# Patient Record
Sex: MALE | Race: WHITE | NOT HISPANIC OR LATINO
[De-identification: names, ages, dates, MRNs, and addresses within clinical notes are randomized per-mention and may not be internally consistent; named-entity substitution may affect disease eponyms.]

---

## 2023-08-29 PROBLEM — Z00.00 ENCOUNTER FOR PREVENTIVE HEALTH EXAMINATION: Status: ACTIVE | Noted: 2023-08-29

## 2023-09-07 ENCOUNTER — APPOINTMENT (OUTPATIENT)
Dept: UROLOGY | Facility: CLINIC | Age: 63
End: 2023-09-07
Payer: SELF-PAY

## 2023-09-07 VITALS
SYSTOLIC BLOOD PRESSURE: 136 MMHG | BODY MASS INDEX: 22.96 KG/M2 | WEIGHT: 155 LBS | HEIGHT: 69 IN | TEMPERATURE: 97.8 F | DIASTOLIC BLOOD PRESSURE: 82 MMHG

## 2023-09-07 PROCEDURE — 99205 OFFICE O/P NEW HI 60 MIN: CPT

## 2023-09-07 RX ORDER — ATORVASTATIN CALCIUM 80 MG/1
TABLET, FILM COATED ORAL
Refills: 0 | Status: ACTIVE | COMMUNITY

## 2023-09-07 RX ORDER — TESTOSTERONE CYPIONATE 200 MG/ML
VIAL (ML) INTRAMUSCULAR
Refills: 0 | Status: ACTIVE | COMMUNITY

## 2023-09-07 RX ORDER — LISINOPRIL 20 MG/1
20 TABLET ORAL
Refills: 0 | Status: ACTIVE | COMMUNITY

## 2023-09-07 NOTE — PHYSICAL EXAM
[General Appearance - Well Developed] : well developed [General Appearance - Well Nourished] : well nourished [Normal Appearance] : normal appearance [Well Groomed] : well groomed [General Appearance - In No Acute Distress] : no acute distress [] : no respiratory distress [Edema] : no peripheral edema [Respiration, Rhythm And Depth] : normal respiratory rhythm and effort [Exaggerated Use Of Accessory Muscles For Inspiration] : no accessory muscle use [Abdomen Soft] : soft [Abdomen Tenderness] : non-tender [Costovertebral Angle Tenderness] : no ~M costovertebral angle tenderness [Urethral Meatus] : meatus normal [Urinary Bladder Findings] : the bladder was normal on palpation [Scrotum] : the scrotum was normal [Testes Mass (___cm)] : there were no testicular masses [No Prostate Nodules] : no prostate nodules [FreeTextEntry1] : 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  Folds of the cylinders are trapped in scar tissue when the device is inflated.  Patient has a significant curvature which gives a hinge effect with no rigidity when the implant is inflated.  Tubing to the reservoir is visible and palpable at the right lateral aspect of the shaft of the penis near the groin.  The pump is in an anterior position and high riding.

## 2023-09-07 NOTE — ASSESSMENT
[FreeTextEntry1] : The patient presents with with the chief complaint of a malfunction of his penile implant. The patient scheduled this consultation to discuss the different treatment options available for his malfunctioning implant. The following note describes the conversation that was performed today during the consultation.   I reviewed the Patient History Form which the patient filled out.  In discussing penile implant replacement surgery, the patient was made aware of the different types of penile implants- including semirigid devices, 2-piece or Ambicor (AMS) devices, and the inflatable penile prosthesis with 3 components. I went on to mention that there are 2 brands of devices, Coloplast and AMS, and that the AMS is impregnated with antibiotic (inhibizone), and the Coloplast is dipped then coated with an antibiotic. I also referred him to my website in order to obtain additional information about the types of implants available.  He felt he would defer to my judgment as to which device to use.  I also described the highlights and benefits of the "No-Touch" surgical technique and outcome data including number of procedures previously performed and updated rate of infection. In this initial discussion of the penile implant option, I made sure we had a very long and brad discussion about the risks.  I stated that, first and foremost, infection is the most dreaded risk and complication, which range in incidence from 1-3% of all cases performed in the [place], but that in my hands, using the "No-Touch" technique the incidence of infection is less than 1%.  I stated that should infection occur, the entire device would need to be removed, which typically happens in the first several weeks after surgery.  I explained that should this occur, there would likely be corporal fibrosis, scarring, penile shortening and even penile necrosis and disfigurement.  I said that while I would do absolutely everything possible to reduce and mitigate this risk, if it occur, the device would have to be removed, and then a salvage procedure with a semi-rigid implant possibly done, or the device would have to be removed with delayed re-implantation, or simply avoid future surgery completely.  I explained what this salvage procedure is, and that a new implant could be placed in the same setting with a complex irrigation of antibiotics and saline lavage, but that the infection risk at this salvage procedure is even higher, up to 30%. Furthermore the possible need for hospitalization, prolonged intravenous antibiotics and need further additional surgery was also discussed.  The patient was informed that if the salvage operation failed or if the infected implant were to be removed completely that significant shortening of the penis would occur making implantation of another device very difficult with very poor outcome and patient satisfaction. I explained that this is a real and significant risk that has to be weighed and considered.  Next I expounded on the other risks of the operation.  These include injury to the urethra or bladder, and should these occur, the operation would have to be altered or aborted.  I explained that very rarely, vascular injury and bleeding can happen, and if iliac vein injury occurs from reservoir placement, this could be catastrophic and result in major blood loss and theoretically risk of leg loss in severe instances.    I went on to discuss that after the implant is placed, penile shortening could likely occur, and this is up to 1-2cm total.  Some of this is due to lack of glans engorgement, though MUSE could be used post-operatively to reduce this factor.  Next I also explained the risk of dissatisfaction with the cosmetic or functional result of the device, meaning that he could simply be unhappy with the result.  Some people find that while they have a good full erection, they have changes in sensation, difficulty obtaining an orgasm, and dissatisfaction with sex in general.  I made sure he verbalized and demonstrated a good understanding of these points.  Next, I explained the risk of device breakage or failure, and future operations might be needed should this occur to fix the device tubing breakages with fluid leaks.  There is also a risk of auto-inflation, and even inability to successfully use the device due to technical considerations and inability to use or find the pump.  I did state that I would be available to him to teach him and train him to use his device, and also available to treat any other issue mentioned above such asdevice breakage or auto-inflation.  Next we discussed the fact that rarely further minor surgery may be needed to make final adjustments to the penile implant. Reasons for this would be to adjust the length of the cylinders, reposition the pump or location of the reservoir.   Prior to scheduling surgery the patient was asked to read the material which is provided to him today, to see a cardiologist to obtain a medical clearance, to visit our website [email or web address]   to obtain additional information, to call patients who were previously implanted and to discuss his options with his partner. Before leaving the consultation, I made sure he verbalized understanding all the risk and benefits, and pros and cons of replacement surgery.  He had the ability to ask questions, and I also explained to him what to expect from the surgery.

## 2023-09-07 NOTE — ASSESSMENT
[FreeTextEntry1] : The patient presents with with the chief complaint of a mechanical breakdown of his penile implant. The patient scheduled this consultation to discuss the different treatment options available for his malfunctioning implant. The following note describes the conversation that was performed today during the consultation.   I reviewed the Patient History Form which the patient filled out.  In discussing penile implant replacement surgery, the patient was made aware of the different types of penile implants- including semirigid devices, 2-piece or Ambicor (AMS) devices, and the inflatable penile prosthesis with 3 components. I went on to mention that there are 2 brands of devices, Coloplast and AMS, and that the AMS is impregnated with antibiotic (inhibizone), and the Coloplast is dipped then coated with an antibiotic. I also referred him to my website in order to obtain additional information about the types of implants available.  He felt he would defer to my judgment as to which device to use.  I also described the highlights and benefits of the "No-Touch" surgical technique and outcome data including number of procedures previously performed and updated rate of infection. In this initial discussion of the penile implant option, I made sure we had a very long and brad discussion about the risks.  I stated that, first and foremost, infection is the most dreaded risk and complication, which range in incidence from 1-3% of all cases performed in the [place], but that in my hands, using the "No-Touch" technique the incidence of infection is less than 1%.  I stated that should infection occur, the entire device would need to be removed, which typically happens in the first several weeks after surgery.  I explained that should this occur, there would likely be corporal fibrosis, scarring, penile shortening and even penile necrosis and disfigurement.  I said that while I would do absolutely everything possible to reduce and mitigate this risk, if it occur, the device would have to be removed, and then a salvage procedure with a semi-rigid implant possibly done, or the device would have to be removed with delayed re-implantation, or simply avoid future surgery completely.  I explained what this salvage procedure is, and that a new implant could be placed in the same setting with a complex irrigation of antibiotics and saline lavage, but that the infection risk at this salvage procedure is even higher, up to 30%. Furthermore the possible need for hospitalization, prolonged intravenous antibiotics and need further additional surgery was also discussed.  The patient was informed that if the salvage operation failed or if the infected implant were to be removed completely that significant shortening of the penis would occur making implantation of another device very difficult with very poor outcome and patient satisfaction. I explained that this is a real and significant risk that has to be weighed and considered.  Next I expounded on the other risks of the operation.  These include injury to the urethra or bladder, and should these occur, the operation would have to be altered or aborted.  I explained that very rarely, vascular injury and bleeding can happen, and if iliac vein injury occurs from reservoir placement, this could be catastrophic and result in major blood loss and theoretically risk of leg loss in severe instances.    I went on to discuss that after the implant is placed, penile shortening could likely occur, and this is up to 1-2cm total.  Some of this is due to lack of glans engorgement, though MUSE could be used post-operatively to reduce this factor.  Next I also explained the risk of dissatisfaction with the cosmetic or functional result of the device, meaning that he could simply be unhappy with the result.  Some people find that while they have a good full erection, they have changes in sensation, difficulty obtaining an orgasm, and dissatisfaction with sex in general.  I made sure he verbalized and demonstrated a good understanding of these points.  Next, I explained the risk of device breakage or failure, and future operations might be needed should this occur to fix the device tubing breakages with fluid leaks.  There is also a risk of auto-inflation, and even inability to successfully use the device due to technical considerations and inability to use or find the pump.  I did state that I would be available to him to teach him and train him to use his device, and also available to treat any other issue mentioned above such asdevice breakage or auto-inflation.  Next we discussed the fact that rarely further minor surgery may be needed to make final adjustments to the penile implant. Reasons for this would be to adjust the length of the cylinders, reposition the pump or location of the reservoir.   Prior to scheduling surgery the patient was asked to read the material which is provided to him today, to see a cardiologist to obtain a medical clearance, to visit our website [email or web address]   to obtain additional information, to call patients who were previously implanted and to discuss his options with his partner. Before leaving the consultation, I made sure he verbalized understanding all the risk and benefits, and pros and cons of replacement surgery.  He had the ability to ask questions, and I also explained to him what to expect from the surgery.  A/P: 63M s/p IPP in [place] in 02/2023  - abnormal healing/scar tissue - dorsal and rightward curvature

## 2023-09-07 NOTE — PLAN
[TextEntry] : - will require removal/replacement of IPP - plan for Coloplast - will require medical/cardiac clearances

## 2023-09-07 NOTE — PHYSICAL EXAM
[General Appearance - Well Developed] : well developed [General Appearance - Well Nourished] : well nourished [Normal Appearance] : normal appearance [Well Groomed] : well groomed [General Appearance - In No Acute Distress] : no acute distress [Edema] : no peripheral edema [] : no respiratory distress [Respiration, Rhythm And Depth] : normal respiratory rhythm and effort [Exaggerated Use Of Accessory Muscles For Inspiration] : no accessory muscle use [Abdomen Soft] : soft [Abdomen Tenderness] : non-tender [Costovertebral Angle Tenderness] : no ~M costovertebral angle tenderness [FreeTextEntry1] : Penile implant examination: The overall appearance of the penis and scrotum shows an abnormal curvature and scar tissue. There is minimal edema and swelling.  There is no bruising bruising noted.  No erythema.   The incision is clean and dry.  The skin edges are well approximated.   The cylinders of the implant are appropriately sized, however there is a dorsal and rightward curvature, with palpable scar tissue. Scrotal skin is intact. The location of the pump is good.   It is concealed yet easily accessible.  There is no tethering of the pump to the skin.  The reservoir is well-positioned and nonpalpable.

## 2023-09-07 NOTE — END OF VISIT
[FreeTextEntry3] : The complete time calculation (  65  minutes) for this patient encounter, includes a review of the patient's past medical records pertinent to his chief complaint, including medical, and surgical history, review of medications taken and of previous visits with other health care providers. In addition to the time spent with the patient, the total time calculation also includes the time necessary to document all of the formation gathered during this  session into the patient's electronic health records. [Time Spent: ___ minutes] : I have spent [unfilled] minutes of time on the encounter.

## 2023-09-07 NOTE — HISTORY OF PRESENT ILLNESS
[FreeTextEntry1] : Patient had an AMS device installed 6 months ago.  He is very unhappy with the device.  He states that he has no rigidity.  He also states that he has deformed and shrunken his penis.

## 2023-09-07 NOTE — PLAN
[TextEntry] : We will need to obtain an operative report from the previous surgeon.  We also need to obtain a product information form from [employer]. Explained to the patient and his wife that the entire device would be needed to be replaced and that we would attempt to break the scar tissue that is tethering the penis.  I also advised him that it would probably not be able to recover his original size but that we would definitely be able to improve the outcome. Patient will need a Coloplast device with classic pump.

## 2023-09-07 NOTE — HISTORY OF PRESENT ILLNESS
[FreeTextEntry1] : 63M s/p IPP in [place] in 02/2023 (right reservoir) Reports that its not working well healed crooked

## 2023-12-15 RX ORDER — ACETAMINOPHEN AND CODEINE PHOSPHATE 300; 30 MG/1; MG/1
300-30 TABLET ORAL
Qty: 40 | Refills: 0 | Status: ACTIVE | COMMUNITY
Start: 2023-12-15 | End: 1900-01-01

## 2023-12-15 RX ORDER — DOCUSATE SODIUM 100 MG/1
100 CAPSULE, LIQUID FILLED ORAL TWICE DAILY
Qty: 56 | Refills: 0 | Status: ACTIVE | COMMUNITY
Start: 2023-12-15 | End: 1900-01-01

## 2023-12-15 RX ORDER — MAGNESIUM HYDROXIDE 400 MG/5ML
7.75 SUSPENSION, ORAL (FINAL DOSE FORM) ORAL
Qty: 355 | Refills: 0 | Status: ACTIVE | COMMUNITY
Start: 2023-12-15 | End: 1900-01-01

## 2023-12-15 RX ORDER — CHLORHEXIDINE GLUCONATE 213 G/1000ML
4 SOLUTION TOPICAL
Qty: 1 | Refills: 0 | Status: ACTIVE | COMMUNITY
Start: 2023-12-15 | End: 1900-01-01

## 2023-12-15 RX ORDER — CEPHALEXIN 250 MG/1
250 CAPSULE ORAL
Qty: 42 | Refills: 0 | Status: ACTIVE | COMMUNITY
Start: 2023-12-15 | End: 1900-01-01

## 2023-12-15 RX ORDER — IBUPROFEN 600 MG/1
600 TABLET, FILM COATED ORAL 3 TIMES DAILY
Qty: 28 | Refills: 0 | Status: ACTIVE | COMMUNITY
Start: 2023-12-15 | End: 1900-01-01

## 2023-12-20 ENCOUNTER — APPOINTMENT (OUTPATIENT)
Dept: UROLOGY | Facility: CLINIC | Age: 63
End: 2023-12-20
Payer: SELF-PAY

## 2023-12-20 DIAGNOSIS — N48.89 OTHER SPECIFIED DISORDERS OF PENIS: ICD-10-CM

## 2023-12-20 DIAGNOSIS — Z96.89 PRESENCE OF OTHER SPECIFIED FUNCTIONAL IMPLANTS: ICD-10-CM

## 2023-12-20 DIAGNOSIS — N48.6 INDURATION PENIS PLASTICA: ICD-10-CM

## 2023-12-20 DIAGNOSIS — I10 ESSENTIAL (PRIMARY) HYPERTENSION: ICD-10-CM

## 2023-12-20 DIAGNOSIS — T83.9XXA UNSPECIFIED COMPLICATION OF GENITOURINARY PROSTHETIC DEVICE, IMPLANT AND GRAFT, INITIAL ENCOUNTER: ICD-10-CM

## 2023-12-20 DIAGNOSIS — T83.490A OTHER MECHANICAL COMPLICATION OF IMPLANTED PENILE PROSTHESIS, INITIAL ENCOUNTER: ICD-10-CM

## 2023-12-20 DIAGNOSIS — R35.0 FREQUENCY OF MICTURITION: ICD-10-CM

## 2023-12-20 DIAGNOSIS — E78.00 PURE HYPERCHOLESTEROLEMIA, UNSPECIFIED: ICD-10-CM

## 2023-12-20 DIAGNOSIS — Z01.818 ENCOUNTER FOR OTHER PREPROCEDURAL EXAMINATION: ICD-10-CM

## 2023-12-20 DIAGNOSIS — Z87.438 PERSONAL HISTORY OF OTHER DISEASES OF MALE GENITAL ORGANS: ICD-10-CM

## 2023-12-20 PROCEDURE — 99215 OFFICE O/P EST HI 40 MIN: CPT | Mod: 25

## 2023-12-20 PROCEDURE — 51741 ELECTRO-UROFLOWMETRY FIRST: CPT

## 2023-12-20 PROCEDURE — 51798 US URINE CAPACITY MEASURE: CPT | Mod: 59

## 2023-12-20 PROCEDURE — 52000 CYSTOURETHROSCOPY: CPT

## 2023-12-20 PROCEDURE — 51725 SIMPLE CYSTOMETROGRAM: CPT

## 2023-12-21 VITALS
BODY MASS INDEX: 23.22 KG/M2 | HEIGHT: 68 IN | DIASTOLIC BLOOD PRESSURE: 80 MMHG | TEMPERATURE: 98 F | SYSTOLIC BLOOD PRESSURE: 122 MMHG | WEIGHT: 153.25 LBS

## 2023-12-21 RX ORDER — OXYCODONE AND ACETAMINOPHEN 5; 325 MG/1; MG/1
5-325 TABLET ORAL EVERY 4 HOURS
Qty: 42 | Refills: 0 | Status: ACTIVE | COMMUNITY
Start: 2023-12-15 | End: 1900-01-01

## 2023-12-21 RX ORDER — SODIUM CHLORIDE 9 MG/ML
1000 INJECTION, SOLUTION INTRAVENOUS
Refills: 0 | Status: DISCONTINUED | OUTPATIENT
Start: 2023-12-22 | End: 2023-12-22

## 2023-12-21 NOTE — ASU PATIENT PROFILE, ADULT - NSICDXPASTMEDICALHX_GEN_ALL_CORE_FT
PAST MEDICAL HISTORY:  Hyperlipidemia     Hypertension      PAST MEDICAL HISTORY:  Hyperlipidemia     Hypertension     Mechanical breakdown of penile prosthesis      PAST MEDICAL HISTORY:  Basal cell carcinoma (BCC)     Hyperlipidemia     Hypertension     Mechanical breakdown of penile prosthesis

## 2023-12-21 NOTE — ASU PATIENT PROFILE, ADULT - FALL HARM RISK - UNIVERSAL INTERVENTIONS
Bed in lowest position, wheels locked, appropriate side rails in place/Call bell, personal items and telephone in reach/Instruct patient to call for assistance before getting out of bed or chair/Non-slip footwear when patient is out of bed/[place] to call system/Physically safe environment - no spills, clutter or unnecessary equipment/Purposeful Proactive Rounding/Room/bathroom lighting operational, light cord in reach Bed in lowest position, wheels locked, appropriate side rails in place/Call bell, personal items and telephone in reach/Instruct patient to call for assistance before getting out of bed or chair/Non-slip footwear when patient is out of bed/[place] to call system/Physically safe environment - no spills, clutter or unnecessary equipment/Purposeful Proactive Rounding/Room/bathroom lighting operational, light cord in reach

## 2023-12-21 NOTE — ASU PATIENT PROFILE, ADULT - NSICDXPASTSURGICALHX_GEN_ALL_CORE_FT
PAST SURGICAL HISTORY:  No significant past surgical history PAST SURGICAL HISTORY:  H/O arthroscopy of knee right    History of implantation of penile prosthesis     History of recent maxillofacial surgery     S/P LASIK surgery      PAST SURGICAL HISTORY:  H/O arthroscopy of knee right    History of implantation of penile prosthesis     History of recent maxillofacial surgery     S/P LASIK surgery     S/P Mohs surgery for basal cell carcinoma

## 2023-12-22 ENCOUNTER — OUTPATIENT (OUTPATIENT)
Dept: OUTPATIENT SERVICES | Facility: HOSPITAL | Age: 63
LOS: 1 days | Discharge: HOME | End: 2023-12-22

## 2023-12-22 ENCOUNTER — TRANSCRIPTION ENCOUNTER (OUTPATIENT)
Age: 63
End: 2023-12-22

## 2023-12-22 ENCOUNTER — APPOINTMENT (OUTPATIENT)
Dept: UROLOGY | Facility: AMBULATORY SURGERY CENTER | Age: 63
End: 2023-12-22
Payer: SELF-PAY

## 2023-12-22 VITALS
OXYGEN SATURATION: 97 % | RESPIRATION RATE: 16 BRPM | DIASTOLIC BLOOD PRESSURE: 66 MMHG | HEART RATE: 89 BPM | TEMPERATURE: 98 F | SYSTOLIC BLOOD PRESSURE: 111 MMHG

## 2023-12-22 VITALS
WEIGHT: 149.03 LBS | HEART RATE: 109 BPM | DIASTOLIC BLOOD PRESSURE: 81 MMHG | OXYGEN SATURATION: 98 % | SYSTOLIC BLOOD PRESSURE: 138 MMHG | RESPIRATION RATE: 15 BRPM | TEMPERATURE: 98 F | HEIGHT: 68 IN

## 2023-12-22 DIAGNOSIS — Z98.890 OTHER SPECIFIED POSTPROCEDURAL STATES: Chronic | ICD-10-CM

## 2023-12-22 DIAGNOSIS — Z96.89 PRESENCE OF OTHER SPECIFIED FUNCTIONAL IMPLANTS: Chronic | ICD-10-CM

## 2023-12-22 PROBLEM — R35.0 FREQUENCY OF URINATION: Status: ACTIVE | Noted: 2023-12-22

## 2023-12-22 PROBLEM — I10 HIGH BLOOD PRESSURE: Status: ACTIVE | Noted: 2023-09-07

## 2023-12-22 PROBLEM — Z01.818 PREOP TESTING: Status: ACTIVE | Noted: 2023-12-15

## 2023-12-22 PROBLEM — N48.6 PEYRONIE'S DISEASE: Status: ACTIVE | Noted: 2023-12-22

## 2023-12-22 PROBLEM — T83.9XXA: Status: ACTIVE | Noted: 2023-12-22

## 2023-12-22 PROBLEM — Z87.438 HISTORY OF ERECTILE DYSFUNCTION: Status: ACTIVE | Noted: 2023-12-22

## 2023-12-22 PROBLEM — E78.00 HIGH CHOLESTEROL: Status: ACTIVE | Noted: 2023-09-07

## 2023-12-22 PROBLEM — N48.89 PENILE CURVATURE, ACQUIRED: Status: ACTIVE | Noted: 2023-12-22

## 2023-12-22 PROBLEM — N48.89 ATROPHY OF CORPUS CAVERNOSUM: Status: ACTIVE | Noted: 2023-12-22

## 2023-12-22 PROBLEM — T83.490A MALFUNCTION OF PENILE PROSTHESIS, INITIAL ENCOUNTER: Status: ACTIVE | Noted: 2023-09-07

## 2023-12-22 PROCEDURE — 54235 NJX CORPORA CAVERNOSA RX AGT: CPT | Mod: GC

## 2023-12-22 PROCEDURE — 54360 PENIS PLASTIC SURGERY: CPT | Mod: GC

## 2023-12-22 PROCEDURE — 54410 REMOVE/REPLACE PENIS PROSTH: CPT | Mod: GC,22

## 2023-12-22 DEVICE — KIT PENILE TITAN ASSEMBLY STANDARD
Type: IMPLANTABLE DEVICE | Status: NON-FUNCTIONAL
Removed: 2023-12-22

## 2023-12-22 DEVICE — SURGIFLO HEMOSTATIC MATRIX KIT
Type: IMPLANTABLE DEVICE | Status: NON-FUNCTIONAL
Removed: 2023-12-22

## 2023-12-22 DEVICE — IMP PENILE TITAN CYL PUMP SET SCROTAL 0 ANG 22CM
Type: IMPLANTABLE DEVICE | Status: NON-FUNCTIONAL
Removed: 2023-12-22

## 2023-12-22 DEVICE — SURGCEL 4 X 8"
Type: IMPLANTABLE DEVICE | Status: NON-FUNCTIONAL
Removed: 2023-12-22

## 2023-12-22 DEVICE — RESERVIOR TITAN CLOVERLEAF 125CC
Type: IMPLANTABLE DEVICE | Status: NON-FUNCTIONAL
Removed: 2023-12-22

## 2023-12-22 RX ORDER — GENTAMICIN SULFATE 40 MG/ML
240 VIAL (ML) INJECTION ONCE
Refills: 0 | Status: COMPLETED | OUTPATIENT
Start: 2023-12-22 | End: 2023-12-22

## 2023-12-22 RX ORDER — CHLORHEXIDINE GLUCONATE 213 G/1000ML
1 SOLUTION TOPICAL ONCE
Refills: 0 | Status: COMPLETED | OUTPATIENT
Start: 2023-12-22 | End: 2023-12-22

## 2023-12-22 RX ORDER — FENTANYL CITRATE 50 UG/ML
25 INJECTION INTRAVENOUS
Refills: 0 | Status: DISCONTINUED | OUTPATIENT
Start: 2023-12-22 | End: 2023-12-22

## 2023-12-22 RX ORDER — ONDANSETRON 8 MG/1
1 TABLET, FILM COATED ORAL
Refills: 0 | DISCHARGE

## 2023-12-22 RX ORDER — ATORVASTATIN CALCIUM 80 MG/1
1 TABLET, FILM COATED ORAL
Refills: 0 | DISCHARGE

## 2023-12-22 RX ORDER — VANCOMYCIN HCL 1 G
1250 VIAL (EA) INTRAVENOUS ONCE
Refills: 0 | Status: COMPLETED | OUTPATIENT
Start: 2023-12-22 | End: 2023-12-22

## 2023-12-22 RX ORDER — LISINOPRIL 2.5 MG/1
1 TABLET ORAL
Refills: 0 | DISCHARGE

## 2023-12-22 RX ADMIN — Medication 166.67 MILLIGRAM(S): at 08:12

## 2023-12-22 RX ADMIN — Medication 200 MILLIGRAM(S): at 09:55

## 2023-12-22 RX ADMIN — SODIUM CHLORIDE 200 MILLILITER(S): 9 INJECTION, SOLUTION INTRAVENOUS at 08:12

## 2023-12-22 NOTE — ASU DISCHARGE PLAN (ADULT/PEDIATRIC) - ASU DC SPECIAL INSTRUCTIONSFT
Please follow up with Dr. May at your scheduled appointment.  Please take your antibiotics as prescribed, be sure to finish the entire course.  You may take the prescribed pain medications as needed for moderate to severe pain. Please follow up with Dr. May at your scheduled appointment.  Please take your antibiotics as prescribed, be sure to finish the entire course.  You may take the prescribed pain medications as needed for moderate to severe pain.  Please follow all post-operative instructions provided to you by Dr. May's office. Please follow up with Dr. May at your scheduled appointment.  Please take your antibiotics as prescribed, be sure to finish the entire course.  You may take the prescribed pain medications as needed for moderate to severe pain.  Please follow all post-operative instructions provided to you by Dr. May's office.    INFLATABLE PENILE PROSTHESIS    SURGICAL WOUND: There are often lumps and bumps that can be felt in the scrotum on either or both sides up to two (2) months or more post operatively. These are of no concern and with time they will soften and disappear.  Any “black and blue” bruising areas will also resolve.  Normally, there is also swelling of the scrotum post operatively. Sometimes the tissue fluid which causes the swelling migrates to the penile skin and can look alarming; with time, all the swelling will eventually subside but may take weeks.  A scrotal support and scrotal fluffs should be worn at all times for the next few weeks, unless bathing, to minimize this swelling. You may apply an ice-pack for 15 minutes out of every hour for the first 24 -36 hours to minimize pain and swelling.    STITCHES: The stitches in the incision will dissolve and fall out by themselves. Sometimes skin stitches may open, allowing a slight gaping of the incision. This is no problem if you keep the area clean.  There is a bluish colored waterproof glue over the incision as well – the glue will peel away and fall off on its own over a couple of weeks.      CATHETER: Some patients are sent home with a Arriaga catheter, while others go home urinating on their own. A Arriaga catheter continuously drains the urine from the bladder. If you still have a catheter, the nurses will review instructions and care before you go home. For men, you may have a prescription for lidocaine jelly to apply to the tip of your penis, as needed, for catheter related discomfort.     PAIN: You may have some intermittent pain for up to six (6) weeks post operatively. Pain does not signify any problem unless associated with fever, chills, or inability to void.  If you experience any fevers or chills please call immediately as this may be signs of an infection. You may take Tylenol (acetaminophen) 650-975mg and/or Motrin (ibuprofen) 400-600mg, both available over the counter, for pain every 6 hours as needed. Do not exceed 4000mg of Tylenol (acetaminophen) daily. You may alternate these medications such that you take one or the other every 3 hours for around the clock pain coverage. For severe pain, take Percocet 5/325mg every 6 hours.    ANTIBIOTICS: You may be given a prescription for an antibiotic, please take this medication as instructed and be sure to complete the entire course.     STOOL SOFTENERS: Do not allow yourself to become constipated as straining may cause bleeding. Take stool softeners or a laxative (ex. Miralax, Colace, Senokot, ExLax, etc), available over the counter, if taking Percocet.     ANTICOAGULATION: If you are taking any blood thinning medications, please discuss with your urologist prior to restarting these medications unless otherwise specified.    BATHING: You may sponge bath 24 hours after surgery, but minimize water to the surgical incision and drain.    DIET: You may resume your regular diet and regular medication regimen.    ACTIVITY: No heavy lifting or strenuous exercise until you are evaluated at your post-operative appointment. Otherwise, you may return to your usual level of physical activity.    FOLLOW-UP: If you did not already schedule your post-operative appointment, please call your urologist to schedule and follow-up appointment.    CALL YOUR UROLOGIST IF: You have any bleeding that does not stop, inability to void >8 hours, fever over 100.4 F, chills, persistent nausea/vomiting, changes in your incision concerning for infection, or if your pain is not controlled on your discharge pain medications.

## 2023-12-22 NOTE — PLAN
[TextEntry] : In this pre-operative setting, I spent an additional 65 minutes to the procedures performed today, ensuring that the discussions we had in the office during the patient's initial consultation and penile Doppler visit was still clear in his mind, that he understood the risks and benefits and pros and cons of the penile implant procedure, including treatment alternatives, and that he verbalized the risks and wanted to proceed.  I again made sure he knew that the penile implant is a prosthesis that contains three basic elements consisting of: two parallel hollow cylinders that are placed within the penis, a pump and valve mechanism that is placed in the scrotum, and a spherical reservoir filled with saline that is placed in the lower abdomen. Although the penile prosthesis is placed through an incision in the scrotum, I explained that this is a surgical procedure that is relatively simple but can be more complicated in the presence of scar tissue in the pelvic area due to previous surgery and which will sometimes require an additional or alternative incision for placement of the reservoir, or potentially a narrow-based device if extensive fibrosis of the penis noted.   We reiterated that the penile implant is designed to simulate but not necessarily replace the natural erectile capability that he previously had.  Once implanted, there will be limited or no capability of natural erections occurring in the future and will limit the opportunity for other treatment options such as pills or injections, to successfully work in his body. When inflated, I explained and he understood that the implant will expand in girth, but not length and when deflated, the penis will become flaccid but potentially remain extended and will not retract due to the cylinders that are placed in the penile shaft.  The implant reservoir has a lock-out valve to limit the opportunity for fluid to flow into the cylinders and create an unexpected or unwanted erection, called "Auto Inflation".  Due to the lock-out valve, this rarely occurs, however if the reservoir is allowed to heal with the penile cylinders maintained partially inflated, he recognized that a partial erection will always be present and it is possible for auto-inflation to occur. We talked about the fact that the penile implant will not grow in length beyond the basic, "stretched penis" level and the measurement, which were obtained during the penile Duplex study.  This is the length he considers to be sufficient for sexual intercourse.  The glans, or tip, of the penis will not expand, as the tip of the cylinders is designed to stabilize and support the glans, but not inflate beyond the penile corona.  He was also counseled that although the results with an inflatable penile prosthesis are very good, the erection will never be as large as the previous normal erection.  The size of the flaccid penis may differ from that before the surgery. He understands that the risk of serious complications during surgery is very low.  There are a number of difficulties that can arise at the time of surgery, and are usually overcome.  Rarely the operation cannot be completed or an alternative to an inflatable penile prosthesis placed such as a semi-rigid implant.  The prosthesis is a mechanical device, which may suffer mechanical failure.  The current 10-year survival rate of the inflatable penile prosthesis is greater than 90%.  An operation can be performed to replace a device that has suffered mechanical failure. He has discussed the post-op care and expectations with Maggy and me.  He realizes that it is not uncommon for patients to experience swelling, bruising, pain, irritation, etc for the first four-to-six weeks after the procedure.   Lastly, I made sure again that he understood that, like any surgical procedure, complications may occur.  These complications include but are not limited to infection (1-4%) and erosion (1-11%) (Erosion is when the prosthesis erodes through to the outside of the body).  An infected prosthesis cannot be saved by antibiotics alone and must then be removed.  It is possible to insert prosthesis at the same operative session (salvage) or at a later date but this is more difficult and the infection rate is higher. If another prosthesis cannot be inserted at the same time as the infected one removed, significant and irreversible penile shortening will occur.             I asked him if he had any further questions and made sure that he understood all of all the pre-operative instructions which were reviewed with him by Maggy my . He was provided with written pre and postoperative instructions, prescriptions and my private cell number. I informed him that he may call for any questions or concerns at any time.

## 2023-12-22 NOTE — PHYSICAL EXAM
[Normal Appearance] : normal appearance [Well Groomed] : well groomed [General Appearance - In No Acute Distress] : no acute distress [Edema] : no peripheral edema [Respiration, Rhythm And Depth] : normal respiratory rhythm and effort [Exaggerated Use Of Accessory Muscles For Inspiration] : no accessory muscle use [Abdomen Soft] : soft [Abdomen Tenderness] : non-tender [Costovertebral Angle Tenderness] : no ~M costovertebral angle tenderness [Urinary Bladder Findings] : the bladder was normal on palpation [Normal Station and Gait] : the gait and station were normal for the patient's age [] : no rash [Oriented To Time, Place, And Person] : oriented to person, place, and time [No Focal Deficits] : no focal deficits [Affect] : the affect was normal [Mood] : the mood was normal [No Palpable Adenopathy] : no palpable adenopathy [TextEntry] : The patient is status post insertion of inflatable multicomponent inflatable penile prosthesis in the past.  The overall appearance is adequate there is no edema, swelling or erythema. The incision is well-healed the edges are well approximated there is no discharge.  Cylinders sizing is inadequate.  The distal tips are well positioned into the mid glans, but a narrowing and 90 degrees curvature is present at the midshaft creating a hinge effect when fully inflated.  Rigidity is very inadequate.  The appearance of the cylinders deflated and the shaft of the penis flaccid is also deformed.  Scrotal skin is intact the location of the pump is anterior in the scrotum, not well concealed, yet accessible to manipulation. Input tubing is also palpable.  There is no tethering of the pump.  The reservoir in the lower quadrant of the abdomen is nonpalpable.

## 2023-12-22 NOTE — BRIEF OPERATIVE NOTE - OPERATION/FINDINGS
14 Fr hopkins catheter inserted. 14 Fr hopkins catheter inserted. Existing AMS device removed in its entirety with reservoir. Insertion of IPP, Coloplast 21cm left and 20cm right. Left 125cc reservoir.

## 2023-12-22 NOTE — HISTORY OF PRESENT ILLNESS
[FreeTextEntry1] : The patient has an  implant and is very unsatisfied with the result. The device is undersized, the erect penis is one inch shorter and is deformed with a quasi 90 degrees upward bent erection which doesn't provide any rigidity. Marked hinge effect when fully inflated.  I met with Mr. and Mrs. Kang in my office prior to preop testing and we had a long discussion regarding several issues such as sizing, limitations of repair and pre and post operative care.

## 2023-12-22 NOTE — ASSESSMENT
[FreeTextEntry1] : ANNETTE ALVARADO  Mar 25 1960  12/22/2023  Procedure: Cystourethroscopy  Location: [facility] at [address] [place] [address]  Surgeon: ELIO May M.D.  Preop Diagnosis: Pre-operative evaluation, urinary frequency, BPH, o obstruction/Urinary retention  Postop Diagnosis: BPH with bladder outlet obstruction and urinary retention Anesthesia: 2% Intraurethral Lidocaine Jelly  Medication: Ciprofloxacin 500 mg  Complication: None  I discussed with the patient, the risks and benefits of cystourethroscopy which includes hematuria, dysuria, stricture formation. The patient agrees to proceed with the above procedure:  Procedure description: The patient was placed in the dorsal lithotomy position, prepped and draped in the usual standard sterile fashion with surgical Betadine soap and wash. 2% lidocaine jelly was inserted intraurethrally via the meatus, and a clamp was applied to the penis and lidocaine jelly was allowed to remain in place for 5 minutes. The meatus was then intubated with a #16 [place] flexible disposable cystoscope. Visual cystourethroscopic examination was initiated under sterile water irrigation.  The following findings were noted:  The anterior urethra was normal.  The Bulbar urethra was also normal.  The membranous urethra was normal without any strictures.  The prostatic urethra, lateral lobe and verumontanum appeared consistent with BPH.  The prostatic urethra was mildly obstructed with bi-lobar hypertrophy and an elevated bladder neck.  Ureteral orifices were identified, and clear efflux of urine was observed bilaterally.  The bladder was examined in its entirety in a systematic fashion. Trabeculation observed: Moderate No mucosal abnormalities, stone, tumors, foreign bodies or diverticula identified. On retroflex, there was no significant prostatic tissue protruding into the bladder.  The patient tolerated the procedure well and he was discharged to home in stable condition.   CYSTOMETROGRAM: Preop Diagnosis: Increased Frequency of urination.  Postop Diagnosis: Increased Frequency of urination. Anesthesia: 2 % Intraurethral Lidocaine Jelly  Medication: Ciprofloxacin Complications: None  Procedure Note:  The findings observed are described in the cystoscopy report. With the flexible cystocope indwelling into the bladder, a sterile line of intravenous saline was connected into a manometer. This allowed us to measure the amount of fluid instilled and intravesical pressure.   The following findings of the cystometrogram were noted.  Bladder wall compliance: Normal Functional bladder capacity: 465   cc The patient perceived a first sensation that the bladder was filling with saline at: 200    cc His first urge to void was observed at   250 cc of saline.  His post void residual was measured at  115   cc Impression: Voiding proprioception: normal  Detrusor instability: not present  Summary: Normal study and no contraindication to proceeding with the surgery. The patient tolerated the procedure well.  BLADDER SCAN: Indication: Increased frequency of urination. Initial Volume:   465 cc PVR: 115  cc Results: Urinary retention Recommendations: No Therapy Needed.  URO FLOWMETRY: Indication: Increased frequency of urination. Urinary Flow Rate:  6.2  m/s Results: Urinary retention Recommendations: No therapy needed.

## 2023-12-22 NOTE — ASU DISCHARGE PLAN (ADULT/PEDIATRIC) - NS MD DC FALL RISK RISK
For information on Fall & Injury Prevention, visit: [email or web address] OR  [email or web address] OR  https://www.cdc.gov/steadi/patient.html For information on Fall & Injury Prevention, visit: [email or web address] OR  [email or web address] OR  https://www.cdc.gov/steadi/patient.html

## 2023-12-22 NOTE — ASU PREOP CHECKLIST - 1.
pt had food poisoning yesterday/ vomited approx 10 times from 1 pm until 3am / feel better now/ Dr May aware

## 2023-12-22 NOTE — ASU DISCHARGE PLAN (ADULT/PEDIATRIC) - CARE PROVIDER_API CALL
Lalo, Dario  Urology  [address] [place] [id number]  Phone: [phone]  Fax: [phone]  Follow Up Time: 1-3 days   Lalo, Dario  Urology  [address] [place] [id number]  Phone: [phone]  Fax: [phone]  Follow Up Time: 1-3 days

## 2023-12-22 NOTE — BRIEF OPERATIVE NOTE - NSICDXBRIEFPREOP_GEN_ALL_CORE_FT
PRE-OP DIAGNOSIS:  Complication of implanted penile prosthesis 22-Dec-2023 10:59:43  Summer Hackett

## 2023-12-22 NOTE — END OF VISIT
[Time Spent: ___ minutes] : I have spent [unfilled] minutes of time on the encounter. [FreeTextEntry3] : I spent an additional 45 minutes with the patient after the duplex study was finished, discussing the diagnosis and result of the study. I also discussed the initial treatment plan with penile injection therapy and the benefits, risks and complication of the penile implant procedure.

## 2023-12-22 NOTE — BRIEF OPERATIVE NOTE - NSICDXBRIEFPOSTOP_GEN_ALL_CORE_FT
10/3/2022    Rikki Reese (:  1958) is a 60 y.o. male, here for evaluation of the following chief complaint(s):  Hypertension      ASSESSMENT/PLAN:     Diagnosis Orders   1. Routine general medical examination at a health care facility  PSA Screening      2. Screening PSA (prostate specific antigen)        3. Screening, lipid  Lipid Panel      4. Primary hypertension  Basic Metabolic Panel    at goal cont meds check renal      5. Prediabetes  Hemoglobin A1C      6. Screen for colon cancer  FLASH - Maame Reardon MD, Gastroenterology, [facility]    he notes BRBPR 30 min after VBM advised again  colonoscopy          Return in about 6 months (around 4/3/2023) for HTN, Metabolic syndrome. An electronic signature was used to authenticate this note.     SUBJECTIVE/OBJECTIVE:  (NOTE : prior results listed below reviewed at this visit to assist in medical decision making.)    HPI / ROS    # HTN - ysabel meds no CP/SOB  BP Readings from Last 3 Encounters:   10/03/22 126/78   22 (!) 137/90   21 (!) 169/92     Lab Results   Component Value Date/Time     2022 09:53 AM    K 4.5 2022 09:53 AM     2022 09:53 AM    CO2 23 2022 09:53 AM    BUN 20 2022 09:53 AM    CREATININE 0.9 2022 09:53 AM    GLUCOSE 103 2022 09:53 AM    CALCIUM 10.1 2022 09:53 AM         # Lipids - recent screening history:  Lab Results   Component Value Date    LDLCALC 104 (H) 2021     Lab Results   Component Value Date    TRIG 106 2021     Lab Results   Component Value Date    HDL 37 (L) 2021     Lab Results   Component Value Date    CHOL 162 2021     DUE    # PSA screening history:  Lab Results   Component Value Date    PSA 0.56 2021    PSA 0.58 2019    PSA 0.53 2015     DUE      Wt Readings from Last 3 Encounters:   10/03/22 234 lb (106.1 kg)   22 260 lb (117.9 kg)   21 238 lb 15.7 oz (108.4 kg)       BP Readings from Last 3 Encounters:   10/03/22 126/78   04/04/22 (!) 137/90   12/24/21 (!) 169/92       PHYSICAL EXAM  Vitals:    10/03/22 0958   BP: 126/78   Site: Left Upper Arm   Position: Sitting   Cuff Size: Large Adult   Pulse: 92   Resp: 16   SpO2: 97%   Weight: 234 lb (106.1 kg)     A&o  Neck no TMG no bruit  Car reg no MGR  Lungs cta  Ext no edema  Skin no jaundice  Eyes anicteric POST-OP DIAGNOSIS:  Complication of implanted penile prosthesis 22-Dec-2023 10:59:54  Summer Hackett

## 2023-12-27 ENCOUNTER — NON-APPOINTMENT (OUTPATIENT)
Age: 63
End: 2023-12-27

## 2024-10-21 NOTE — ASU PREOP CHECKLIST - BOWEL PREP
her appointment with orthopedics for MRI and further evaluation.  Advised patient to use her ibuprofen that she has been prescribed.  Advised pt to take Ibuprofen for swelling and inflammation.  Advised pt to take Tylenol as needed for pain.  Educated pt and mother on RICE therapy.  Advised pt that compression will help with swelling.  Pt actively participated in plan of care and stated an understanding regarding plan of care and medication education provided.  Medication education provided to patient, patient stated understanding.  Advised patient to follow-up with PCP as discussed.  Patient stated an understanding regarding plan of care and is amenable to discharge.  Questions and concerns answered at this time.      PATIENT REFERRED TO:  Crystal Jalloh APRN - CNP  [address] [facility] [address]      DISCHARGE MEDICATIONS:  Discharge Medication List as of 10/21/2024  6:28 PM          Discharge Medication List as of 10/21/2024  6:28 PM        STOP taking these medications       Prenatal MV-Min-Fe Fum-FA-DHA (PRENATAL 1 PO) Comments:   Reason for Stopping:               Discharge Medication List as of 10/21/2024  6:28 PM          KAL Leiva CNP    (Please note that portions of this note were completed with a voice recognition program. Efforts were made to edit the dictations but occasionally words are mis-transcribed.)            Radha Moeller APRN - CNP  10/21/24 0805     n/a

## (undated) DEVICE — SUT PROLENE 4-0 14" V-47

## (undated) DEVICE — SUT PDS II 3-0 27" RB-1

## (undated) DEVICE — BAG DECANTER IV STERILE

## (undated) DEVICE — BAG SPONGE COUNTER EZ

## (undated) DEVICE — MARKING PEN DEVON DUAL TIP W RULER

## (undated) DEVICE — ELCTR PENCIL SMOKE EVACUATOR COATED PUSH BUTTON 70MM

## (undated) DEVICE — DRAIN URINARY LEG BAG WITH FLIP-FLO VALVE 32OZ

## (undated) DEVICE — WARMING BLANKET UPPER ADULT

## (undated) DEVICE — LONE STAR ELASTIC STAY HOOK 12MM BLUNT

## (undated) DEVICE — DRSG TAPE UMBILICAL COTTON 2" X 30 X 1/8"

## (undated) DEVICE — PACK PROST PENILE LNX SURGICOUNT

## (undated) DEVICE — SUT VICRYL 3-0 27" RB-1 UNDYED

## (undated) DEVICE — GLV 9 PROTEXIS (WHITE)

## (undated) DEVICE — DRSG DERMABOND 0.7ML

## (undated) DEVICE — SUPP ATHLETIC MALE XLG 44-55IN

## (undated) DEVICE — LONE STAR DILAMEZINSERT INSERTER BLUE 12MM

## (undated) DEVICE — SLV COMPRESSION KNEE MED

## (undated) DEVICE — DRSG COMBINE 5X9"

## (undated) DEVICE — PREP SCRUB BRUSH W CHG 4%

## (undated) DEVICE — PREP CHLORAPREP HI-LITE ORANGE 26ML